# Patient Record
Sex: MALE | Race: WHITE
[De-identification: names, ages, dates, MRNs, and addresses within clinical notes are randomized per-mention and may not be internally consistent; named-entity substitution may affect disease eponyms.]

---

## 2019-11-01 ENCOUNTER — HOSPITAL ENCOUNTER (OUTPATIENT)
Dept: HOSPITAL 62 - RAD | Age: 59
End: 2019-11-01
Attending: INTERNAL MEDICINE
Payer: COMMERCIAL

## 2019-11-01 ENCOUNTER — HOSPITAL ENCOUNTER (OUTPATIENT)
Dept: HOSPITAL 62 - OD | Age: 59
End: 2019-11-01
Attending: INTERNAL MEDICINE
Payer: COMMERCIAL

## 2019-11-01 DIAGNOSIS — K21.9: ICD-10-CM

## 2019-11-01 DIAGNOSIS — Z00.00: Primary | ICD-10-CM

## 2019-11-01 DIAGNOSIS — K22.2: Primary | ICD-10-CM

## 2019-11-01 LAB
ADD MANUAL DIFF: NO
ALBUMIN SERPL-MCNC: 4.5 G/DL (ref 3.5–5)
ALP SERPL-CCNC: 69 U/L (ref 38–126)
ANION GAP SERPL CALC-SCNC: 8 MMOL/L (ref 5–19)
AST SERPL-CCNC: 24 U/L (ref 17–59)
BASOPHILS # BLD AUTO: 0 10^3/UL (ref 0–0.2)
BASOPHILS NFR BLD AUTO: 0.8 % (ref 0–2)
BILIRUB DIRECT SERPL-MCNC: 0.1 MG/DL (ref 0–0.4)
BILIRUB SERPL-MCNC: 0.9 MG/DL (ref 0.2–1.3)
BUN SERPL-MCNC: 13 MG/DL (ref 7–20)
CALCIUM: 10 MG/DL (ref 8.4–10.2)
CHLORIDE SERPL-SCNC: 103 MMOL/L (ref 98–107)
CHOLEST SERPL-MCNC: 241.36 MG/DL (ref 0–200)
CO2 SERPL-SCNC: 28 MMOL/L (ref 22–30)
EOSINOPHIL # BLD AUTO: 0.2 10^3/UL (ref 0–0.6)
EOSINOPHIL NFR BLD AUTO: 3.9 % (ref 0–6)
ERYTHROCYTE [DISTWIDTH] IN BLOOD BY AUTOMATED COUNT: 13.3 % (ref 11.5–14)
GLUCOSE SERPL-MCNC: 106 MG/DL (ref 75–110)
HCT VFR BLD CALC: 46.5 % (ref 37.9–51)
HGB BLD-MCNC: 16.1 G/DL (ref 13.5–17)
LDLC SERPL DIRECT ASSAY-MCNC: 176 MG/DL (ref ?–100)
LYMPHOCYTES # BLD AUTO: 1.7 10^3/UL (ref 0.5–4.7)
LYMPHOCYTES NFR BLD AUTO: 28.4 % (ref 13–45)
MCH RBC QN AUTO: 30.7 PG (ref 27–33.4)
MCHC RBC AUTO-ENTMCNC: 34.6 G/DL (ref 32–36)
MCV RBC AUTO: 89 FL (ref 80–97)
MONOCYTES # BLD AUTO: 0.5 10^3/UL (ref 0.1–1.4)
MONOCYTES NFR BLD AUTO: 8.4 % (ref 3–13)
NEUTROPHILS # BLD AUTO: 3.5 10^3/UL (ref 1.7–8.2)
NEUTS SEG NFR BLD AUTO: 58.5 % (ref 42–78)
PLATELET # BLD: 218 10^3/UL (ref 150–450)
POTASSIUM SERPL-SCNC: 4.5 MMOL/L (ref 3.6–5)
PROT SERPL-MCNC: 7.7 G/DL (ref 6.3–8.2)
PSA SERPL-MCNC: 0.89 NG/ML (ref ?–4)
RBC # BLD AUTO: 5.22 10^6/UL (ref 4.35–5.55)
TOTAL CELLS COUNTED % (AUTO): 100 %
TRIGL SERPL-MCNC: 113 MG/DL (ref ?–150)
VLDLC SERPL CALC-MCNC: 23 MG/DL (ref 10–31)
WBC # BLD AUTO: 6 10^3/UL (ref 4–10.5)

## 2019-11-01 PROCEDURE — 85025 COMPLETE CBC W/AUTO DIFF WBC: CPT

## 2019-11-01 PROCEDURE — 74247: CPT

## 2019-11-01 PROCEDURE — 36415 COLL VENOUS BLD VENIPUNCTURE: CPT

## 2019-11-01 PROCEDURE — 80053 COMPREHEN METABOLIC PANEL: CPT

## 2019-11-01 PROCEDURE — 80061 LIPID PANEL: CPT

## 2019-11-01 PROCEDURE — 84153 ASSAY OF PSA TOTAL: CPT

## 2019-11-01 PROCEDURE — 84443 ASSAY THYROID STIM HORMONE: CPT

## 2019-11-01 NOTE — RADIOLOGY REPORT (SQ)
EXAM DESCRIPTION:  UGI SERIES



COMPLETED DATE/TIME:  11/1/2019 9:37 am



REASON FOR STUDY:  *W/BASW* ESOPHAGEAL OBSTRUCTION (K22.2) K22.2  ESOPHAGEAL OBSTRUCTION



COMPARISON:  None.



TECHNIQUE:  Under fluoroscopic guidance, patient ingested effervescent granules followed by thick and
 thin barium.  Fluoroscopic spot images and routine radiographic images acquired and stored on PACS.

12 MM BARIUM TABLET GIVEN: Yes.

2 MINUTES DELAY IN PASSAGE AT THE GE JUNCTION.



LIMITATIONS:  None.



FLUOROSCOPY TIME:  FLUORO TIME: 3 minutes

19 images saved to PACS.



FINDINGS:  NEUROMUSCULAR COORDINATION OF SWALLOW: Normal.  No aspiration.

ESOPHAGEAL MOTILITY: Normal peristalsis.  No esophageal spasm.

ESOPHAGEAL MUCOSA: Normal mucosa without masses or ulceration.  Mild narrowing at the GE junction.

GASTRO-ESOPHAGEAL JUNCTION: Mild gastroesophageal reflux.  No hiatal hernia.  2 minutes delay in pass
age of the 12 mm barium tablet across the GE junction.

STOMACH: Normal without masses or ulcerations.

GASTRIC OUTLET: No delay in emptying.  Normal pylorus.

DUODENAL BULB: Normal distention. No spasm or ulceration.

DUODENUM: Prominent mucosal folds throughout the 2nd and 3rd portions of the duodenum.  Cannot rule o
ut inflammatory process.

PROXIMAL SMALL BOWEL: Mucosa normal.  No extrinsic masses or malrotation.

NON-GI TRACT STRUCTURES: No significant finding.

OTHER: No other significant finding.



IMPRESSION:  1.  MILD NARROWING AT THE GE JUNCTION WHICH DELAYS PASSAGE OF THE 12 MM BARIUM TABLET FO
R APPROXIMATELY 2 MINUTES.  MILD REFLUX NOTED.

2.  PROMINENT MUCOSAL FOLDS OF THE 2ND AND 3RD PORTION THE DUODENUM WITH MILD LUMINAL NARROWING.  ASHLEY
NTAR PROCESS CANNOT BE RULED ENTIRELY RULED OUT.



COMMENT:  Recommend endoscopy for further evaluation of the distal esophagus and duodenum.

Quality :  Final reports for procedures using fluoroscopy that document radiation exposure tres
isaac, or exposure time and number of fluorographic images (if radiation exposure indices are not avail
able)



TECHNICAL DOCUMENTATION:  JOB ID:  [id number]

 2011 [employer]- All Rights Reserved



Reading location - IP/workstation name: [email or web address]